# Patient Record
Sex: FEMALE | Race: BLACK OR AFRICAN AMERICAN | URBAN - NONMETROPOLITAN AREA
[De-identification: names, ages, dates, MRNs, and addresses within clinical notes are randomized per-mention and may not be internally consistent; named-entity substitution may affect disease eponyms.]

---

## 2023-01-19 ENCOUNTER — TELEPHONE (OUTPATIENT)
Dept: FAMILY MEDICINE CLINIC | Age: 38
End: 2023-01-19

## 2023-01-19 NOTE — TELEPHONE ENCOUNTER
Left   for patient to return call to office Makayla Bhatia does want the patient to have the [address] [facility].  I left central schedulings Alexa 30 # [phone] she can call to schedule or if she has further questions to call the office Reason for Call:  Other [place] requesting pre-op notes    Detailed comments: Elizabeth from [facility] called requesting pt's pre-op notes on 5/31/2022. Pt has surgery scheduled for tomorrow. If you have any questions, you can call Elizabeth back at  [phone] and please fax pre-op notes to this fax number: [phone]. Thank you.    Phone Number Patient can be reached at: Home number on file [phone] (home)    Best Time: Anytime    Can we leave a detailed message on this number? YES    Call taken on 6/7/2022 at 11:59 AM by Ajay Chacon